# Patient Record
Sex: FEMALE | Race: WHITE | HISPANIC OR LATINO
[De-identification: names, ages, dates, MRNs, and addresses within clinical notes are randomized per-mention and may not be internally consistent; named-entity substitution may affect disease eponyms.]

---

## 2018-11-14 ENCOUNTER — APPOINTMENT (OUTPATIENT)
Dept: ANTEPARTUM | Facility: CLINIC | Age: 28
End: 2018-11-14
Payer: MEDICAID

## 2018-11-14 ENCOUNTER — ASOB RESULT (OUTPATIENT)
Age: 28
End: 2018-11-14

## 2018-11-14 PROBLEM — Z00.00 ENCOUNTER FOR PREVENTIVE HEALTH EXAMINATION: Status: ACTIVE | Noted: 2018-11-14

## 2018-11-14 PROCEDURE — 76801 OB US < 14 WKS SINGLE FETUS: CPT

## 2019-02-13 ENCOUNTER — APPOINTMENT (OUTPATIENT)
Dept: ANTEPARTUM | Facility: CLINIC | Age: 29
End: 2019-02-13
Payer: MEDICAID

## 2019-02-13 ENCOUNTER — ASOB RESULT (OUTPATIENT)
Age: 29
End: 2019-02-13

## 2019-02-13 PROCEDURE — 76811 OB US DETAILED SNGL FETUS: CPT

## 2019-02-13 PROCEDURE — 76817 TRANSVAGINAL US OBSTETRIC: CPT

## 2019-04-10 ENCOUNTER — APPOINTMENT (OUTPATIENT)
Dept: ANTEPARTUM | Facility: CLINIC | Age: 29
End: 2019-04-10
Payer: MEDICAID

## 2019-04-10 ENCOUNTER — ASOB RESULT (OUTPATIENT)
Age: 29
End: 2019-04-10

## 2019-04-10 PROCEDURE — 76820 UMBILICAL ARTERY ECHO: CPT

## 2019-04-10 PROCEDURE — 76821 MIDDLE CEREBRAL ARTERY ECHO: CPT

## 2019-04-10 PROCEDURE — 76819 FETAL BIOPHYS PROFIL W/O NST: CPT

## 2019-04-10 PROCEDURE — 76816 OB US FOLLOW-UP PER FETUS: CPT

## 2019-04-24 ENCOUNTER — OUTPATIENT (OUTPATIENT)
Dept: OUTPATIENT SERVICES | Facility: HOSPITAL | Age: 29
LOS: 1 days | End: 2019-04-24
Payer: COMMERCIAL

## 2019-04-24 ENCOUNTER — APPOINTMENT (OUTPATIENT)
Age: 29
End: 2019-04-24
Payer: MEDICAID

## 2019-04-24 ENCOUNTER — ASOB RESULT (OUTPATIENT)
Age: 29
End: 2019-04-24

## 2019-04-24 VITALS — TEMPERATURE: 98 F | RESPIRATION RATE: 16 BRPM

## 2019-04-24 VITALS — RESPIRATION RATE: 18 BRPM | TEMPERATURE: 98 F

## 2019-04-24 DIAGNOSIS — O47.03 FALSE LABOR BEFORE 37 COMPLETED WEEKS OF GESTATION, THIRD TRIMESTER: ICD-10-CM

## 2019-04-24 LAB
ALBUMIN SERPL ELPH-MCNC: 3.5 G/DL — SIGNIFICANT CHANGE UP (ref 3.3–5.2)
ALP SERPL-CCNC: 140 U/L — HIGH (ref 40–120)
ALT FLD-CCNC: 8 U/L — SIGNIFICANT CHANGE UP
ANION GAP SERPL CALC-SCNC: 12 MMOL/L — SIGNIFICANT CHANGE UP (ref 5–17)
APPEARANCE UR: ABNORMAL
AST SERPL-CCNC: 12 U/L — SIGNIFICANT CHANGE UP
BASOPHILS # BLD AUTO: 0 K/UL — SIGNIFICANT CHANGE UP (ref 0–0.2)
BASOPHILS NFR BLD AUTO: 0.3 % — SIGNIFICANT CHANGE UP (ref 0–2)
BILIRUB SERPL-MCNC: <0.2 MG/DL — LOW (ref 0.4–2)
BILIRUB UR-MCNC: NEGATIVE — SIGNIFICANT CHANGE UP
BUN SERPL-MCNC: 6 MG/DL — LOW (ref 8–20)
CALCIUM SERPL-MCNC: 8.5 MG/DL — LOW (ref 8.6–10.2)
CHLORIDE SERPL-SCNC: 102 MMOL/L — SIGNIFICANT CHANGE UP (ref 98–107)
CO2 SERPL-SCNC: 19 MMOL/L — LOW (ref 22–29)
COLOR SPEC: YELLOW — SIGNIFICANT CHANGE UP
CREAT SERPL-MCNC: 0.26 MG/DL — LOW (ref 0.5–1.3)
DIFF PNL FLD: ABNORMAL
EOSINOPHIL # BLD AUTO: 0 K/UL — SIGNIFICANT CHANGE UP (ref 0–0.5)
EOSINOPHIL NFR BLD AUTO: 0.6 % — SIGNIFICANT CHANGE UP (ref 0–6)
EPI CELLS # UR: SIGNIFICANT CHANGE UP
FIBRONECTIN FETAL SPEC QL: POSITIVE
GLUCOSE SERPL-MCNC: 99 MG/DL — SIGNIFICANT CHANGE UP (ref 70–115)
GLUCOSE UR QL: NEGATIVE MG/DL — SIGNIFICANT CHANGE UP
HCT VFR BLD CALC: 33.3 % — LOW (ref 37–47)
HGB BLD-MCNC: 10.9 G/DL — LOW (ref 12–16)
KETONES UR-MCNC: ABNORMAL
LEUKOCYTE ESTERASE UR-ACNC: ABNORMAL
LYMPHOCYTES # BLD AUTO: 2 K/UL — SIGNIFICANT CHANGE UP (ref 1–4.8)
LYMPHOCYTES # BLD AUTO: 29.4 % — SIGNIFICANT CHANGE UP (ref 20–55)
MCHC RBC-ENTMCNC: 28.4 PG — SIGNIFICANT CHANGE UP (ref 27–31)
MCHC RBC-ENTMCNC: 32.7 G/DL — SIGNIFICANT CHANGE UP (ref 32–36)
MCV RBC AUTO: 86.7 FL — SIGNIFICANT CHANGE UP (ref 81–99)
MONOCYTES # BLD AUTO: 0.4 K/UL — SIGNIFICANT CHANGE UP (ref 0–0.8)
MONOCYTES NFR BLD AUTO: 6.5 % — SIGNIFICANT CHANGE UP (ref 3–10)
NEUTROPHILS # BLD AUTO: 4.3 K/UL — SIGNIFICANT CHANGE UP (ref 1.8–8)
NEUTROPHILS NFR BLD AUTO: 62 % — SIGNIFICANT CHANGE UP (ref 37–73)
NITRITE UR-MCNC: NEGATIVE — SIGNIFICANT CHANGE UP
PH UR: 6.5 — SIGNIFICANT CHANGE UP (ref 5–8)
PLATELET # BLD AUTO: 185 K/UL — SIGNIFICANT CHANGE UP (ref 150–400)
POTASSIUM SERPL-MCNC: 3.9 MMOL/L — SIGNIFICANT CHANGE UP (ref 3.5–5.3)
POTASSIUM SERPL-SCNC: 3.9 MMOL/L — SIGNIFICANT CHANGE UP (ref 3.5–5.3)
PROT SERPL-MCNC: 7.1 G/DL — SIGNIFICANT CHANGE UP (ref 6.6–8.7)
PROT UR-MCNC: 30 MG/DL
RBC # BLD: 3.84 M/UL — LOW (ref 4.4–5.2)
RBC # FLD: 13 % — SIGNIFICANT CHANGE UP (ref 11–15.6)
RBC CASTS # UR COMP ASSIST: ABNORMAL /HPF (ref 0–4)
SODIUM SERPL-SCNC: 133 MMOL/L — LOW (ref 135–145)
SP GR SPEC: 1.02 — SIGNIFICANT CHANGE UP (ref 1.01–1.02)
UROBILINOGEN FLD QL: 1 MG/DL
WBC # BLD: 6.9 K/UL — SIGNIFICANT CHANGE UP (ref 4.8–10.8)
WBC # FLD AUTO: 6.9 K/UL — SIGNIFICANT CHANGE UP (ref 4.8–10.8)
WBC UR QL: ABNORMAL

## 2019-04-24 PROCEDURE — 93976 VASCULAR STUDY: CPT

## 2019-04-24 PROCEDURE — 59025 FETAL NON-STRESS TEST: CPT

## 2019-04-24 PROCEDURE — 85027 COMPLETE CBC AUTOMATED: CPT

## 2019-04-24 PROCEDURE — 36415 COLL VENOUS BLD VENIPUNCTURE: CPT

## 2019-04-24 PROCEDURE — 76820 UMBILICAL ARTERY ECHO: CPT

## 2019-04-24 PROCEDURE — 80053 COMPREHEN METABOLIC PANEL: CPT

## 2019-04-24 PROCEDURE — 76821 MIDDLE CEREBRAL ARTERY ECHO: CPT

## 2019-04-24 PROCEDURE — 96372 THER/PROPH/DIAG INJ SC/IM: CPT

## 2019-04-24 PROCEDURE — 87081 CULTURE SCREEN ONLY: CPT

## 2019-04-24 PROCEDURE — 81001 URINALYSIS AUTO W/SCOPE: CPT

## 2019-04-24 PROCEDURE — 76819 FETAL BIOPHYS PROFIL W/O NST: CPT

## 2019-04-24 PROCEDURE — 87491 CHLMYD TRACH DNA AMP PROBE: CPT

## 2019-04-24 PROCEDURE — 87591 N.GONORRHOEAE DNA AMP PROB: CPT

## 2019-04-24 PROCEDURE — 87800 DETECT AGNT MULT DNA DIREC: CPT

## 2019-04-24 PROCEDURE — G0463: CPT

## 2019-04-24 PROCEDURE — 82731 ASSAY OF FETAL FIBRONECTIN: CPT

## 2019-04-24 RX ORDER — NITROFURANTOIN MACROCRYSTAL 50 MG
100 CAPSULE ORAL
Qty: 0 | Refills: 0 | Status: DISCONTINUED | OUTPATIENT
Start: 2019-04-24 | End: 2019-05-09

## 2019-04-24 RX ORDER — ACETAMINOPHEN 500 MG
975 TABLET ORAL ONCE
Qty: 0 | Refills: 0 | Status: COMPLETED | OUTPATIENT
Start: 2019-04-24 | End: 2019-04-24

## 2019-04-24 RX ORDER — SODIUM CHLORIDE 9 MG/ML
1000 INJECTION, SOLUTION INTRAVENOUS
Qty: 0 | Refills: 0 | Status: DISCONTINUED | OUTPATIENT
Start: 2019-04-24 | End: 2019-05-09

## 2019-04-24 RX ORDER — SODIUM CHLORIDE 9 MG/ML
1000 INJECTION, SOLUTION INTRAVENOUS ONCE
Qty: 0 | Refills: 0 | Status: COMPLETED | OUTPATIENT
Start: 2019-04-24 | End: 2019-04-24

## 2019-04-24 RX ORDER — ACETAMINOPHEN 500 MG
2 TABLET ORAL
Qty: 40 | Refills: 0
Start: 2019-04-24 | End: 2019-04-28

## 2019-04-24 RX ORDER — NITROFURANTOIN MACROCRYSTAL 50 MG
1 CAPSULE ORAL
Qty: 14 | Refills: 0
Start: 2019-04-24 | End: 2019-04-30

## 2019-04-24 RX ADMIN — Medication 975 MILLIGRAM(S): at 18:33

## 2019-04-24 RX ADMIN — Medication 100 MILLIGRAM(S): at 20:05

## 2019-04-24 RX ADMIN — Medication 975 MILLIGRAM(S): at 19:03

## 2019-04-24 RX ADMIN — SODIUM CHLORIDE 125 MILLILITER(S): 9 INJECTION, SOLUTION INTRAVENOUS at 20:01

## 2019-04-24 RX ADMIN — Medication 12 MILLIGRAM(S): at 19:40

## 2019-04-24 RX ADMIN — SODIUM CHLORIDE 2000 MILLILITER(S): 9 INJECTION, SOLUTION INTRAVENOUS at 18:24

## 2019-04-24 NOTE — OB PROVIDER TRIAGE NOTE - NSOBPROVIDERNOTE_OBGYN_ALL_OB_FT
29 y/o F  @ 32wks 5ds complicated by mild polyhydramnios comes to L&D complaining of Left Lower Abdominal/Pelvic pain, dysuria, vaginal secretions. Physical is concerning for CVA on Left and Left sided tenderness with moderate voluntary guarding. Cervix closed. Will observe to rule out labor. Labs obtain to r/o UTI, VB, GC/Chlamydia. 29 y/o F  @ 32wks 5ds complicated by mild polyhydramnios comes to L&D complaining of Left Lower Abdominal/Pelvic pain, dysuria, vaginal secretions. Physical is concerning for CVA on Left and Left sided tenderness with moderate voluntary guarding. Cervix closed. Will observe to rule out labor. Labs obtain to r/o UTI, VB, GC/Chlamydia.    Addendum:  Dr. Fitzgerald Note  Ua positive for uti  ffn positive   patient status post iv hydration with cessation of pain and contractions  plan;  discharge home  give betamethasone x 2 days  treat for uti  increase IV hydration 29 y/o F  @ 32wks 5ds complicated by mild polyhydramnios comes to L&D complaining of Left Lower Abdominal/Pelvic pain, dysuria, vaginal secretions. Physical is concerning for CVA on Left and Left sided tenderness with moderate voluntary guarding. Cervix closed. Will observe to rule out labor. Labs obtain to r/o UTI, VB, GC/Chlamydia.    Addendum:  FFN +, UTI positive leukocyte esterase and leukocytosis, negative nitrates.  Pt feels better after PO tylenol and IV hydration.   Dx: UTI   Tx:   Betamethasone 100 mg IM q24 hrs x2 days  Macrobid 100 mg BID x7 days  Tylenol 650 mg PO q6hrs PRN for Moderate Pain  Aggressive at Home hydration.  Discharge home.  Patient informed of results and treatment. Educated to come back if symptoms worsen.     Case discussed with [facility] and Attending on Call, Dr. Fitzgerald  -José Miguel Giordano MD;  PGY-1      Addendum:  Dr. Fitzgerald Note  Ua positive for uti  ffn positive   patient status post iv hydration with cessation of pain and contractions  plan;  discharge home  give betamethasone x 2 days  treat for uti  increase IV hydration

## 2019-04-24 NOTE — OB PROVIDER TRIAGE NOTE - NSHPPHYSICALEXAM_GEN_ALL_CORE
Const: Awake, alert and oriented x3. Uncomfortable appereance.  HEENT: NC/AT, PERRLA, EOMI, clear nares, Moist mucous membranes. No erythema, lesions, secretions.   Neck:. Soft and supple. Full ROM without pain.  Cardiovascular: Regular rate and regular rhythm. +S1/S2. No murmurs. Peripheral pulses 2+ and symmetric. No LE edema.  Respiratory: Speaking in full sentences. No evidence of respiratory distress. CTAB. No wheezes, crackles, rales or rhonchi.  Abd: Gravid Abdomen, Normal bowel sounds in all 4 quadrants, Soft,  Left lower quadrant tenderness with moderate guarding. No rebound. Negative Boyce, McBurney, Rovsing.   Back: Spine midline, non-tender. No muscular spasms. Left sided CVA Tenderness.  Cervical: Moderate White Secretion, 0/0/-3, Medium

## 2019-04-24 NOTE — OB PROVIDER TRIAGE NOTE - HISTORY OF PRESENT ILLNESS
27 y/o F  @ 32wks 5ds complicated by mild polyhydramnios comes to L&D complaining of Left Lower Abdominal/Pelvic pain that started today at 13:00. Pain is described as sharp and intermittent every 10 mins, non radiating. Also complains of 3 days of burning in urination and 1 week of yellowish vaginal secretions without fetid smell. Last sexual intercourse was last week. Denies HA, Fever, Chills, Malaise, CP, SOB, N/V/D/C, hematuria, vaginal bleeding, water rupture, feeling contractions, hx of nephrolithiasis. Attest to normal fetal movement, last PO intake was at 13:00.

## 2019-04-24 NOTE — OB RN TRIAGE NOTE - CHIEF COMPLAINT QUOTE
from Dr Yousif office to R/O UTI,  labor  Patient c/o pain LLQ 5/10 Q10 minutes.  patient states she has burning, frequency and pressure upon urination x 2 weeks  yellow discharge x 3 days from Dr Yousif office to R/O UTI,  labor  Patient c/o pain LLQ 5/10 Q10 minutes.  patient states she has burning, frequency and pressure upon urination x 2 weeks  yellow discharge x 3 days lower back pain

## 2019-04-24 NOTE — OB PROVIDER TRIAGE NOTE - ADDITIONAL INSTRUCTIONS
1. keflex  2. return in 24 hours for betamethasone  3. follow up in 2 weeks in [facility]

## 2019-04-25 ENCOUNTER — OUTPATIENT (OUTPATIENT)
Dept: OUTPATIENT SERVICES | Facility: HOSPITAL | Age: 29
LOS: 1 days | End: 2019-04-25
Payer: COMMERCIAL

## 2019-04-25 VITALS — SYSTOLIC BLOOD PRESSURE: 108 MMHG | DIASTOLIC BLOOD PRESSURE: 61 MMHG | HEART RATE: 84 BPM

## 2019-04-25 VITALS — SYSTOLIC BLOOD PRESSURE: 117 MMHG | DIASTOLIC BLOOD PRESSURE: 59 MMHG | HEART RATE: 94 BPM

## 2019-04-25 DIAGNOSIS — O47.03 FALSE LABOR BEFORE 37 COMPLETED WEEKS OF GESTATION, THIRD TRIMESTER: ICD-10-CM

## 2019-04-25 PROCEDURE — G0463: CPT

## 2019-04-25 PROCEDURE — 59025 FETAL NON-STRESS TEST: CPT

## 2019-04-25 RX ADMIN — Medication 12 MILLIGRAM(S): at 20:25

## 2019-04-25 NOTE — OB PROVIDER TRIAGE NOTE - NSHPPHYSICALEXAM_GEN_ALL_CORE
normal...
Vital Signs   T(F): 97.7 (25 Apr 2019 20:13), Max: 97.7 (25 Apr 2019 20:13)  HR: 84 (25 Apr 2019 20:34) (84 - 94)  BP: 108/61 (25 Apr 2019 20:34) (108/61 - 117/59)  RR: 17 (25 Apr 2019 20:33) (17 - 17)  SpO2: --    PHYSICAL EXAM  Gen: NAD, resting comfortably  Abd: gravid, nontender to palpation  SVE: deferred    FHT: category 1  [place]: no contractions

## 2019-04-25 NOTE — OB PROVIDER TRIAGE NOTE - NSOBPROVIDERNOTE_OBGYN_ALL_OB_FT
27 yo  at 32.6 weeks pregnancy complicated by mild polyhydramnios presents for her 2nd betamethasone injection.    -Got 2nd betamethasone injection  -FHT reassuring  -No contractions on toco  -Dysuria improving, on day#2 of 7 day Macrobid treatment  -Discharged with prenatal precautions  -To follow up with Ob/Gyn with scheduled appt    -Discussed with Dr Camacho

## 2019-04-25 NOTE — OB PROVIDER TRIAGE NOTE - HISTORY OF PRESENT ILLNESS
29 yo  at 32.6 weeks pregnancy complicated by mild polyhydramnios presents for her 2nd betamethasone injection. PAtient presented yesterday to L&D with lower abd pain and dysuria found to have a UTI and positive fFN. Patient was given her first dose of betameth yesterday and discharged with Macrobid for the UTI. She reports improvement in her dysuria. She denies any abd pain, contractions, LOF. She reports mild spotting last night and positive FM.

## 2019-04-26 LAB
C TRACH RRNA SPEC QL NAA+PROBE: SIGNIFICANT CHANGE UP
CULTURE RESULTS: SIGNIFICANT CHANGE UP
N GONORRHOEA RRNA SPEC QL NAA+PROBE: SIGNIFICANT CHANGE UP
SPECIMEN SOURCE: SIGNIFICANT CHANGE UP
SPECIMEN SOURCE: SIGNIFICANT CHANGE UP

## 2019-04-27 LAB
CANDIDA AB TITR SER: SIGNIFICANT CHANGE UP
G VAGINALIS DNA SPEC QL NAA+PROBE: DETECTED
T VAGINALIS SPEC QL WET PREP: SIGNIFICANT CHANGE UP

## 2019-05-15 ENCOUNTER — ASOB RESULT (OUTPATIENT)
Age: 29
End: 2019-05-15

## 2019-05-15 ENCOUNTER — APPOINTMENT (OUTPATIENT)
Dept: ANTEPARTUM | Facility: CLINIC | Age: 29
End: 2019-05-15
Payer: MEDICAID

## 2019-05-15 PROBLEM — Z20.2 CONTACT WITH AND (SUSPECTED) EXPOSURE TO INFECTIONS WITH A PREDOMINANTLY SEXUAL MODE OF TRANSMISSION: Chronic | Status: ACTIVE | Noted: 2019-04-24

## 2019-05-15 PROCEDURE — 76819 FETAL BIOPHYS PROFIL W/O NST: CPT

## 2019-05-15 PROCEDURE — 76816 OB US FOLLOW-UP PER FETUS: CPT

## 2019-05-29 ENCOUNTER — APPOINTMENT (OUTPATIENT)
Dept: ANTEPARTUM | Facility: CLINIC | Age: 29
End: 2019-05-29
Payer: MEDICAID

## 2019-05-29 ENCOUNTER — ASOB RESULT (OUTPATIENT)
Age: 29
End: 2019-05-29

## 2019-05-29 PROCEDURE — ZZZZZ: CPT

## 2019-05-29 PROCEDURE — 76819 FETAL BIOPHYS PROFIL W/O NST: CPT

## 2019-06-12 ENCOUNTER — ASOB RESULT (OUTPATIENT)
Age: 29
End: 2019-06-12

## 2019-06-12 ENCOUNTER — APPOINTMENT (OUTPATIENT)
Dept: ANTEPARTUM | Facility: CLINIC | Age: 29
End: 2019-06-12
Payer: MEDICAID

## 2019-06-12 PROCEDURE — 76816 OB US FOLLOW-UP PER FETUS: CPT

## 2019-06-12 PROCEDURE — 76819 FETAL BIOPHYS PROFIL W/O NST: CPT

## 2019-06-16 ENCOUNTER — OUTPATIENT (OUTPATIENT)
Dept: INPATIENT UNIT | Facility: HOSPITAL | Age: 29
LOS: 1 days | End: 2019-06-16
Payer: COMMERCIAL

## 2019-06-16 VITALS
HEART RATE: 81 BPM | RESPIRATION RATE: 18 BRPM | SYSTOLIC BLOOD PRESSURE: 131 MMHG | DIASTOLIC BLOOD PRESSURE: 79 MMHG | TEMPERATURE: 98 F

## 2019-06-16 VITALS — HEART RATE: 79 BPM | SYSTOLIC BLOOD PRESSURE: 119 MMHG | DIASTOLIC BLOOD PRESSURE: 69 MMHG

## 2019-06-16 DIAGNOSIS — O47.1 FALSE LABOR AT OR AFTER 37 COMPLETED WEEKS OF GESTATION: ICD-10-CM

## 2019-06-16 PROCEDURE — 59025 FETAL NON-STRESS TEST: CPT

## 2019-06-16 PROCEDURE — G0463: CPT

## 2019-06-16 NOTE — OB PROVIDER TRIAGE NOTE - NSHPPHYSICALEXAM_GEN_ALL_CORE
Vital Sign  T(F): 98.2 (16 Jun 2019 16:31), Max: 98.2 (16 Jun 2019 16:31)  HR: 81 (16 Jun 2019 16:37) (81 - 81)  BP: 131/79 (16 Jun 2019 16:37) (131/79 - 131/79)  RR: 18 (16 Jun 2019 16:31) (18 - 18)      Gen: Adult female sitting upright, NAD  GI: Soft non-tender, gravid abdomen  GYN: 2/0/-3  MSK: Bi-lateral non-pitting edema of the lower extremities

## 2019-06-16 NOTE — OB PROVIDER TRIAGE NOTE - HISTORY OF PRESENT ILLNESS
a 27 yo  LMP 2018, RITU 2019 EGA 40wks 2 days presents to L&D w/ complaints of having contractions. The patient states the contractions are occurring q15 mins.     Patient admits: +FM, vaginal discharge(white, thin, non-foul smelling)    pmhx: none  Meds: pnv  allergies: Penicillin (hives, puritiirs), penuts( anaphylaxis   Social: denies the use of  tobacco, alcohol and illegal drugs a 29 yo  LMP 2018, RITU 2019 EGA 40wks 2 days presents to L&D w/ complaints of having contractions. The patient states the contractions are occurring q15 mins.     Patient admits: +FM, vaginal discharge(white, thin, non-foul smelling)  Patient denies: Fever, chills, nausea, vomiting, blurry vision, cp, sob, abdominal pain, dysuria, vaginal bleeding.    pmhx: none  Meds: pnv  allergies: Penicillin (hives, puritiirs), penuts( anaphylaxis   Social: denies the use of  tobacco, alcohol and illegal drugs

## 2019-06-16 NOTE — OB PROVIDER TRIAGE NOTE - NSOBPROVIDERNOTE_OBGYN_ALL_OB_FT
a 29 yo  LMP 2018, RITU 2019 EGA 40wks 2 days presents to L&D w/ complaints of having contractions.      A/P  - NST- reactive- no evidence of contractions noted  - Patient has been instructed to follow up at [facility]. Patient has an appointment scheduled for 2019 a 27 yo  LMP 2018, RITU 2019 EGA 40wks 2 days presents to L&D w/ complaints of having contractions.      A/P  - NST- reactive- no evidence of contractions noted  - Patient has been instructed to follow up at [facility]. Patient has an appointment scheduled for 2019  - Patient has been instructed if she develops contractions q5mins, evidence of LOF, Vaginal bleeding or decreased Fetal movement to return to L&D.    Case has been discussed w attending on file.

## 2019-06-19 ENCOUNTER — INPATIENT (INPATIENT)
Facility: HOSPITAL | Age: 29
LOS: 2 days | Discharge: HOME | End: 2019-06-22
Attending: OBSTETRICS & GYNECOLOGY | Admitting: OBSTETRICS & GYNECOLOGY
Payer: COMMERCIAL

## 2019-06-19 VITALS
SYSTOLIC BLOOD PRESSURE: 129 MMHG | DIASTOLIC BLOOD PRESSURE: 83 MMHG | TEMPERATURE: 98 F | HEART RATE: 91 BPM | RESPIRATION RATE: 20 BRPM

## 2019-06-19 DIAGNOSIS — O47.1 FALSE LABOR AT OR AFTER 37 COMPLETED WEEKS OF GESTATION: ICD-10-CM

## 2019-06-19 DIAGNOSIS — O26.893 OTHER SPECIFIED PREGNANCY RELATED CONDITIONS, THIRD TRIMESTER: ICD-10-CM

## 2019-06-19 LAB
APPEARANCE UR: CLEAR — SIGNIFICANT CHANGE UP
BACTERIA # UR AUTO: ABNORMAL
BASOPHILS # BLD AUTO: 0 K/UL — SIGNIFICANT CHANGE UP (ref 0–0.2)
BASOPHILS NFR BLD AUTO: 0.1 % — SIGNIFICANT CHANGE UP (ref 0–2)
BILIRUB UR-MCNC: NEGATIVE — SIGNIFICANT CHANGE UP
COLOR SPEC: YELLOW — SIGNIFICANT CHANGE UP
DIFF PNL FLD: ABNORMAL
EOSINOPHIL # BLD AUTO: 0.2 K/UL — SIGNIFICANT CHANGE UP (ref 0–0.5)
EOSINOPHIL NFR BLD AUTO: 2.5 % — SIGNIFICANT CHANGE UP (ref 0–6)
EPI CELLS # UR: ABNORMAL
GLUCOSE UR QL: NEGATIVE MG/DL — SIGNIFICANT CHANGE UP
HCT VFR BLD CALC: 35.2 % — LOW (ref 37–47)
HGB BLD-MCNC: 11.6 G/DL — LOW (ref 12–16)
KETONES UR-MCNC: ABNORMAL
LEUKOCYTE ESTERASE UR-ACNC: ABNORMAL
LYMPHOCYTES # BLD AUTO: 1.9 K/UL — SIGNIFICANT CHANGE UP (ref 1–4.8)
LYMPHOCYTES # BLD AUTO: 26.8 % — SIGNIFICANT CHANGE UP (ref 20–55)
MCHC RBC-ENTMCNC: 27.6 PG — SIGNIFICANT CHANGE UP (ref 27–31)
MCHC RBC-ENTMCNC: 33 G/DL — SIGNIFICANT CHANGE UP (ref 32–36)
MCV RBC AUTO: 83.6 FL — SIGNIFICANT CHANGE UP (ref 81–99)
MONOCYTES # BLD AUTO: 0.4 K/UL — SIGNIFICANT CHANGE UP (ref 0–0.8)
MONOCYTES NFR BLD AUTO: 5.1 % — SIGNIFICANT CHANGE UP (ref 3–10)
NEUTROPHILS # BLD AUTO: 4.6 K/UL — SIGNIFICANT CHANGE UP (ref 1.8–8)
NEUTROPHILS NFR BLD AUTO: 64.9 % — SIGNIFICANT CHANGE UP (ref 37–73)
NITRITE UR-MCNC: NEGATIVE — SIGNIFICANT CHANGE UP
PH UR: 6 — SIGNIFICANT CHANGE UP (ref 5–8)
PLATELET # BLD AUTO: 193 K/UL — SIGNIFICANT CHANGE UP (ref 150–400)
PROT UR-MCNC: 15 MG/DL
RBC # BLD: 4.21 M/UL — LOW (ref 4.4–5.2)
RBC # FLD: 14.4 % — SIGNIFICANT CHANGE UP (ref 11–15.6)
RBC CASTS # UR COMP ASSIST: ABNORMAL /HPF (ref 0–4)
SP GR SPEC: 1.02 — SIGNIFICANT CHANGE UP (ref 1.01–1.02)
UROBILINOGEN FLD QL: 1 MG/DL
WBC # BLD: 7.1 K/UL — SIGNIFICANT CHANGE UP (ref 4.8–10.8)
WBC # FLD AUTO: 7.1 K/UL — SIGNIFICANT CHANGE UP (ref 4.8–10.8)
WBC UR QL: ABNORMAL

## 2019-06-19 RX ORDER — OXYTOCIN 10 UNIT/ML
333.33 VIAL (ML) INJECTION
Qty: 20 | Refills: 0 | Status: COMPLETED | OUTPATIENT
Start: 2019-06-19 | End: 2019-06-20

## 2019-06-19 RX ORDER — SODIUM CHLORIDE 9 MG/ML
1000 INJECTION, SOLUTION INTRAVENOUS
Refills: 0 | Status: DISCONTINUED | OUTPATIENT
Start: 2019-06-19 | End: 2019-06-20

## 2019-06-19 RX ORDER — CITRIC ACID/SODIUM CITRATE 300-500 MG
15 SOLUTION, ORAL ORAL EVERY 6 HOURS
Refills: 0 | Status: DISCONTINUED | OUTPATIENT
Start: 2019-06-19 | End: 2019-06-20

## 2019-06-19 RX ORDER — OXYTOCIN 10 UNIT/ML
333.33 VIAL (ML) INJECTION
Qty: 20 | Refills: 0 | Status: DISCONTINUED | OUTPATIENT
Start: 2019-06-19 | End: 2019-06-19

## 2019-06-19 RX ORDER — CITRIC ACID/SODIUM CITRATE 300-500 MG
30 SOLUTION, ORAL ORAL ONCE
Refills: 0 | Status: DISCONTINUED | OUTPATIENT
Start: 2019-06-19 | End: 2019-06-19

## 2019-06-19 RX ORDER — SODIUM CHLORIDE 9 MG/ML
1000 INJECTION, SOLUTION INTRAVENOUS
Refills: 0 | Status: DISCONTINUED | OUTPATIENT
Start: 2019-06-19 | End: 2019-06-19

## 2019-06-19 NOTE — OB PROVIDER H&P - HISTORY OF PRESENT ILLNESS
a 27 yo  LMP 2018 EGA 40wks RITU 2019 presents to L&D w/ complaints of contractions and LOF. The patient states the contractions are occurring q3-4mins and LOF+ at 9am on 6/15/2019.    Pt admits to: LOF and +FM  Patient denies: fever, chills, visual disturbances, cp, sob, dysuria, abdominal pain    pmhx: pt denies  pshx: pt denies  allergies: Penicillin, Hives  Social: Denies the use of alcohol, tobacco or illegal drugs a 29 yo  LMP 2018 EGA 40wks and 5 days RITU 2019 presents to L&D w/ complaints of contractions and LOF. The patient states the contractions are occurring q3-4mins and LOF+ at 9am on 6/15/2019.    Pt admits to: LOF and +FM  Patient denies: fever, chills, visual disturbances, cp, sob, dysuria, abdominal pain    pmhx: pt denies  pshx: pt denies  allergies: Penicillin, Hives  Social: Denies the use of alcohol, tobacco or illegal drugs

## 2019-06-19 NOTE — OB RN PATIENT PROFILE - ALERT: PERTINENT HISTORY
Ultra Screen at 12 Weeks/Fetal Non-Stress Test (NST)/1st Trimester Sonogram/20 Week Level II Sonogram

## 2019-06-19 NOTE — OB PROVIDER H&P - NSHPPHYSICALEXAM_GEN_ALL_CORE
Vital Signs  HR: 91 (19 Jun 2019 22:23) (91 - 91)  BP: 129/83 (19 Jun 2019 22:23) (129/83 - 129/83)      Gen: Adult female sitting upright, mild discomfort  Abdomen: Soft non-tender, gravid  GYN: performed by Nurse 3/50/-3    Sono performed at bedside- evidence vertex presentation    FHT: CAT1  [place]: regular q3-4mins

## 2019-06-19 NOTE — OB PROVIDER H&P - ALERT: PERTINENT HISTORY
Fetal Non-Stress Test (NST)/Ultra Screen at 12 Weeks/1st Trimester Sonogram/20 Week Level II Sonogram

## 2019-06-19 NOTE — OB PROVIDER H&P - ASSESSMENT
a 27 yo  LMP 2018 EGA 40wks RITU 2019 presents to L&D w/ complaints of contractions and LOF.      A/P  - Admit to labor and delivery  - routine labs  - consent  - expectant management      Discussed w/ attending on file

## 2019-06-20 ENCOUNTER — TRANSCRIPTION ENCOUNTER (OUTPATIENT)
Age: 29
End: 2019-06-20

## 2019-06-20 LAB
BLD GP AB SCN SERPL QL: SIGNIFICANT CHANGE UP
TYPE + AB SCN PNL BLD: SIGNIFICANT CHANGE UP

## 2019-06-20 RX ORDER — ACETAMINOPHEN 500 MG
650 TABLET ORAL ONCE
Refills: 0 | Status: COMPLETED | OUTPATIENT
Start: 2019-06-20 | End: 2019-06-20

## 2019-06-20 RX ORDER — LANOLIN
1 OINTMENT (GRAM) TOPICAL EVERY 6 HOURS
Refills: 0 | Status: DISCONTINUED | OUTPATIENT
Start: 2019-06-20 | End: 2019-06-22

## 2019-06-20 RX ORDER — TETANUS TOXOID, REDUCED DIPHTHERIA TOXOID AND ACELLULAR PERTUSSIS VACCINE, ADSORBED 5; 2.5; 8; 8; 2.5 [IU]/.5ML; [IU]/.5ML; UG/.5ML; UG/.5ML; UG/.5ML
0.5 SUSPENSION INTRAMUSCULAR ONCE
Refills: 0 | Status: DISCONTINUED | OUTPATIENT
Start: 2019-06-20 | End: 2019-06-22

## 2019-06-20 RX ORDER — PRAMOXINE HYDROCHLORIDE 150 MG/15G
1 AEROSOL, FOAM RECTAL EVERY 4 HOURS
Refills: 0 | Status: DISCONTINUED | OUTPATIENT
Start: 2019-06-20 | End: 2019-06-22

## 2019-06-20 RX ORDER — ACETAMINOPHEN 500 MG
975 TABLET ORAL
Refills: 0 | Status: DISCONTINUED | OUTPATIENT
Start: 2019-06-20 | End: 2019-06-22

## 2019-06-20 RX ORDER — SODIUM CHLORIDE 9 MG/ML
3 INJECTION INTRAMUSCULAR; INTRAVENOUS; SUBCUTANEOUS EVERY 8 HOURS
Refills: 0 | Status: DISCONTINUED | OUTPATIENT
Start: 2019-06-20 | End: 2019-06-21

## 2019-06-20 RX ORDER — KETOROLAC TROMETHAMINE 30 MG/ML
30 SYRINGE (ML) INJECTION ONCE
Refills: 0 | Status: DISCONTINUED | OUTPATIENT
Start: 2019-06-20 | End: 2019-06-20

## 2019-06-20 RX ORDER — DIPHENHYDRAMINE HCL 50 MG
25 CAPSULE ORAL EVERY 6 HOURS
Refills: 0 | Status: DISCONTINUED | OUTPATIENT
Start: 2019-06-20 | End: 2019-06-22

## 2019-06-20 RX ORDER — DIBUCAINE 1 %
1 OINTMENT (GRAM) RECTAL EVERY 6 HOURS
Refills: 0 | Status: DISCONTINUED | OUTPATIENT
Start: 2019-06-20 | End: 2019-06-22

## 2019-06-20 RX ORDER — BENZOCAINE 10 %
1 GEL (GRAM) MUCOUS MEMBRANE EVERY 6 HOURS
Refills: 0 | Status: DISCONTINUED | OUTPATIENT
Start: 2019-06-20 | End: 2019-06-22

## 2019-06-20 RX ORDER — GLYCERIN ADULT
1 SUPPOSITORY, RECTAL RECTAL AT BEDTIME
Refills: 0 | Status: DISCONTINUED | OUTPATIENT
Start: 2019-06-20 | End: 2019-06-22

## 2019-06-20 RX ORDER — OXYCODONE HYDROCHLORIDE 5 MG/1
5 TABLET ORAL ONCE
Refills: 0 | Status: DISCONTINUED | OUTPATIENT
Start: 2019-06-20 | End: 2019-06-22

## 2019-06-20 RX ORDER — OXYTOCIN 10 UNIT/ML
2 VIAL (ML) INJECTION
Qty: 30 | Refills: 0 | Status: DISCONTINUED | OUTPATIENT
Start: 2019-06-20 | End: 2019-06-22

## 2019-06-20 RX ORDER — IBUPROFEN 200 MG
600 TABLET ORAL EVERY 6 HOURS
Refills: 0 | Status: DISCONTINUED | OUTPATIENT
Start: 2019-06-20 | End: 2019-06-22

## 2019-06-20 RX ORDER — IBUPROFEN 200 MG
600 TABLET ORAL EVERY 6 HOURS
Refills: 0 | Status: COMPLETED | OUTPATIENT
Start: 2019-06-20 | End: 2020-05-18

## 2019-06-20 RX ORDER — SODIUM CHLORIDE 9 MG/ML
1000 INJECTION, SOLUTION INTRAVENOUS
Refills: 0 | Status: DISCONTINUED | OUTPATIENT
Start: 2019-06-20 | End: 2019-06-22

## 2019-06-20 RX ORDER — MAGNESIUM HYDROXIDE 400 MG/1
30 TABLET, CHEWABLE ORAL
Refills: 0 | Status: DISCONTINUED | OUTPATIENT
Start: 2019-06-20 | End: 2019-06-22

## 2019-06-20 RX ORDER — CITRIC ACID/SODIUM CITRATE 300-500 MG
30 SOLUTION, ORAL ORAL ONCE
Refills: 0 | Status: COMPLETED | OUTPATIENT
Start: 2019-06-20 | End: 2019-06-20

## 2019-06-20 RX ORDER — DOCUSATE SODIUM 100 MG
100 CAPSULE ORAL
Refills: 0 | Status: DISCONTINUED | OUTPATIENT
Start: 2019-06-20 | End: 2019-06-22

## 2019-06-20 RX ORDER — OXYCODONE HYDROCHLORIDE 5 MG/1
5 TABLET ORAL
Refills: 0 | Status: DISCONTINUED | OUTPATIENT
Start: 2019-06-20 | End: 2019-06-22

## 2019-06-20 RX ORDER — OXYTOCIN 10 UNIT/ML
333.33 VIAL (ML) INJECTION
Qty: 20 | Refills: 0 | Status: DISCONTINUED | OUTPATIENT
Start: 2019-06-20 | End: 2019-06-22

## 2019-06-20 RX ORDER — SIMETHICONE 80 MG/1
80 TABLET, CHEWABLE ORAL EVERY 4 HOURS
Refills: 0 | Status: DISCONTINUED | OUTPATIENT
Start: 2019-06-20 | End: 2019-06-22

## 2019-06-20 RX ORDER — HYDROCORTISONE 1 %
1 OINTMENT (GRAM) TOPICAL EVERY 6 HOURS
Refills: 0 | Status: DISCONTINUED | OUTPATIENT
Start: 2019-06-20 | End: 2019-06-22

## 2019-06-20 RX ORDER — AER TRAVELER 0.5 G/1
1 SOLUTION RECTAL; TOPICAL EVERY 4 HOURS
Refills: 0 | Status: DISCONTINUED | OUTPATIENT
Start: 2019-06-20 | End: 2019-06-22

## 2019-06-20 RX ADMIN — Medication 30 MILLILITER(S): at 02:15

## 2019-06-20 RX ADMIN — Medication 2 MILLIUNIT(S)/MIN: at 05:30

## 2019-06-20 RX ADMIN — SODIUM CHLORIDE 125 MILLILITER(S): 9 INJECTION, SOLUTION INTRAVENOUS at 00:00

## 2019-06-20 RX ADMIN — SODIUM CHLORIDE 125 MILLILITER(S): 9 INJECTION, SOLUTION INTRAVENOUS at 08:00

## 2019-06-20 RX ADMIN — Medication 1000 MILLIUNIT(S)/MIN: at 13:03

## 2019-06-20 RX ADMIN — Medication 650 MILLIGRAM(S): at 09:50

## 2019-06-20 RX ADMIN — Medication 30 MILLIGRAM(S): at 14:22

## 2019-06-20 RX ADMIN — Medication 1000 MILLIUNIT(S)/MIN: at 17:58

## 2019-06-20 RX ADMIN — Medication 30 MILLIGRAM(S): at 14:06

## 2019-06-20 RX ADMIN — Medication 650 MILLIGRAM(S): at 09:25

## 2019-06-20 NOTE — DISCHARGE NOTE OB - HOSPITAL COURSE
Patient is a 29yo  delivered via spontaneous vaginal delivery. She was transferred to postpartum unit without complications during her stay. Upon discharge she is voiding, tolerating PO, ambulating, and pain is well controlled.

## 2019-06-20 NOTE — OB PROVIDER DELIVERY SUMMARY - NSPROVIDERDELIVERYNOTE_OBGYN_ALL_OB_FT
at 13:03 PM of a live male, 4590 gm and Apgars 9/9. Delivered ABRAHAM, nuchal cord x 1 loose, clear fluid. Infant's head delivered with maternal expulsive efforts. Shoulders delivered without difficulty followed by the rest of the body. Nose and mouth were bulb suctioned. Cord clamped and cut after delay. Samples obtained. Baby handed to patient. Placenta delivered spontaneously, intact, 3VC. Fundus firm, minimal bleeding. Perineum and vagina inspected – no lacerations noted. EBL 200cc. Hemostasis noted. Pt tolerated procedure well, in stable condition, recovering in LDR. Infant in LDR. Instrument/sponge count correct x 2 and confirmed by nurse.  at 13:03 PM of a live male, 4590 gm and Apgars 9/9. Delivered ABRAHAM, nuchal cord x 1 loose, clear fluid. Infant's head delivered with maternal expulsive efforts. Shoulders delivered without difficulty followed by the rest of the body. Nose and mouth were bulb suctioned. Cord clamped and cut after delay. Samples obtained. Baby placed on mother's abdomen. Placenta delivered spontaneously, intact, 3VC. Fundus firm, minimal bleeding. Perineum and vagina inspected – no lacerations noted. EBL 200cc. Hemostasis noted. Pt tolerated procedure well, in stable condition, recovering in LDR. Infant in LDR. Instrument/sponge count correct x 2 and confirmed by nurse.    I was present throughout procedure  Nuchal cord delivered through, shoulders delivered without difficulty.  ppalos

## 2019-06-20 NOTE — DISCHARGE NOTE OB - MEDICATION SUMMARY - MEDICATIONS TO TAKE
I will START or STAY ON the medications listed below when I get home from the hospital:     mg oral tablet  -- 1 tab(s) by mouth every 8 hours   -- Do not take this drug if you are pregnant.  It is very important that you take or use this exactly as directed.  Do not skip doses or discontinue unless directed by your doctor.  May cause drowsiness or dizziness.  Obtain medical advice before taking any non-prescription drugs as some may affect the action of this medication.  Take with food or milk.    -- Indication: For moderate pain

## 2019-06-20 NOTE — DISCHARGE NOTE OB - CARE PLAN
Principal Discharge DX:	 (normal spontaneous vaginal delivery)  Goal:	rapid recovery  Assessment and plan of treatment:	Please call your provider to schedule postpartum visit in 6 weeks. Take medications as directed, regular diet, activity as tolerated. Exclusive breast feeding for the first 6 months is recommended. Nothing per vagina for 6 weeks (incl. sex, douching, etc). If you have additional concerns, please inform your provider.

## 2019-06-20 NOTE — CHART NOTE - NSCHARTNOTEFT_GEN_A_CORE
Pt reexamined:  5cm/50% effaced, +evidence of ROM but bulging forebag also palpable.  AROM again, clear fluid, mod amt.  Will continue to observe for now and consider pitocin prn.

## 2019-06-20 NOTE — DISCHARGE NOTE OB - PATIENT PORTAL LINK FT
You can access the [facility] Patient Portal, offered by [facility], by registering with the following website: [email or web address]

## 2019-06-20 NOTE — CHART NOTE - NSCHARTNOTEFT_GEN_A_CORE
OB attending progress note    S: Patient comfortable with epidural, no complains of pressure. s/p recent epidural bolus    O; VSS maternal flow sheet.    FHT: Cat 2 for non recurrent variables, moderate variability, accels  [place]: every 2-3min    VE: 9.5/100/-2    A/P: Multip with IUP @ 65ipn6p admitted for labor. GBSneg. Maternal/fetal status reassuring    -reassess in 1hr or prn  -cont current mgmt  ppalos

## 2019-06-20 NOTE — DISCHARGE NOTE OB - PLAN OF CARE
rapid recovery Please call your provider to schedule postpartum visit in 6 weeks. Take medications as directed, regular diet, activity as tolerated. Exclusive breast feeding for the first 6 months is recommended. Nothing per vagina for 6 weeks (incl. sex, douching, etc). If you have additional concerns, please inform your provider.

## 2019-06-20 NOTE — CHART NOTE - NSCHARTNOTEFT_GEN_A_CORE
OB attending progress note    S: Patient reports pressure with contractions.     O: VSS per mat flow sheet    Vital Signs Last 24 Hrs  T(C): 38.4 (20 Jun 2019 09:15), Max: 38.4 (20 Jun 2019 09:15)  T(F): 101.12 (20 Jun 2019 09:15), Max: 101.12 (20 Jun 2019 09:15)  HR: 71 (20 Jun 2019 09:55) (56 - 95)  BP: 107/60 (20 Jun 2019 09:55) (88/44 - 145/64)  BP(mean): --  RR: 15 (20 Jun 2019 07:15) (15 - 20)  SpO2: 96% (20 Jun 2019 07:15) (94% - 100%)    FHt: Cat 2 for non recurrent variables. Moderate variability, accels  [place]: every  2-3min    VE: 10/100/0    LABS:                        11.6   7.1   )-----------( 193      ( 19 Jun 2019 22:54 )             35.2     A/P: Multip with IUP @ 21qub6a admitted for labor. GBSneg. Maternal/fetal status reassuring    -allow for passive descent  -reassess in 1hr or prn    ppalos

## 2019-06-20 NOTE — DISCHARGE NOTE OB - CARE PROVIDER_API CALL
Win Fair)  Obstetrics and Gynecology  [address] [place] [address]  Phone: [phone]  Fax: [phone]  Follow Up Time:

## 2019-06-20 NOTE — CHART NOTE - NSCHARTNOTEFT_GEN_A_CORE
Patient seen and examined at bedside.    Vital Signs  T(F): 98.42 (20 Jun 2019 01:31), Max: 98.42 (20 Jun 2019 01:31)  HR: 69 (20 Jun 2019 02:55) (60 - 95)  BP: 114/63 (20 Jun 2019 02:55) (90/52 - 137/93)  RR: 20 (20 Jun 2019 01:31) (20 - 20)  SpO2: 98% (20 Jun 2019 03:00) (97% - 100%)    FHT: CAT 1  [place]: q5-7min irregular        AROM at 03:00 am  - Clear, moderate amount  - Cervical exam performed patient was noted to be 5/50/-3      a/p  - S/P epidural  - routine expectant mange ment      Discussed w/ attending on file.

## 2019-06-21 LAB
HCT VFR BLD CALC: 32.1 % — LOW (ref 37–47)
HGB BLD-MCNC: 10 G/DL — LOW (ref 12–16)
T PALLIDUM AB TITR SER: NEGATIVE — SIGNIFICANT CHANGE UP

## 2019-06-21 RX ADMIN — Medication 600 MILLIGRAM(S): at 13:00

## 2019-06-21 RX ADMIN — Medication 600 MILLIGRAM(S): at 18:31

## 2019-06-21 RX ADMIN — Medication 975 MILLIGRAM(S): at 16:11

## 2019-06-21 RX ADMIN — Medication 975 MILLIGRAM(S): at 17:00

## 2019-06-21 RX ADMIN — Medication 1 TABLET(S): at 12:12

## 2019-06-21 RX ADMIN — Medication 600 MILLIGRAM(S): at 17:59

## 2019-06-21 RX ADMIN — Medication 600 MILLIGRAM(S): at 05:22

## 2019-06-21 RX ADMIN — Medication 600 MILLIGRAM(S): at 12:12

## 2019-06-21 NOTE — PROGRESS NOTE ADULT - ASSESSMENT
A/P:  Patient is a 27yo  now PPD#1 s/p spontaneous vaginal delivery  -Stable  -Voiding, tolerating PO, bowel function nml   -Advance care as tolerated   -Continue routine postpartum/postoperative care and education.  -Pt encouraged to increase ambulation and PO intake  -D/C day 2 if meeting all expected milestones

## 2019-06-21 NOTE — PROGRESS NOTE ADULT - SUBJECTIVE AND OBJECTIVE BOX
Patient is a 27yo  now PPD#1 s/p spontaneous vaginal delivery    S:    No acute events overnight.  Pt seen and examined at bedside. Pt doing well.  Has no complaints.  Pain well controlled on current regiment.  Pt ambulating, tolerating PO, voiding w/o difficulty, +flatus/+BM.    O:    T(C): 37 (19 @ 19:53), Max: 38.3 (19 @ 09:15)  HR: 74 (19 @ 19:53) (68 - 107)  BP: 121/74 (19 @ 19:53) (100/52 - 141/80)  RR: 18 (19 @ 19:53) (15 - 18)  SpO2: 96% (19 @ 07:15) (94% - 96%)    Physical Exam  Breast: NT, non-engorged  Abdomen:  soft, NT, ND, BS+  Uterus:  firm below umbilicus  VE:  expectant lochia  Ext:  NT, nonedematous                          11.6   7.1   )-----------( 193      ( 2019 22:54 )             35.2

## 2019-06-22 VITALS
SYSTOLIC BLOOD PRESSURE: 108 MMHG | RESPIRATION RATE: 18 BRPM | TEMPERATURE: 98 F | DIASTOLIC BLOOD PRESSURE: 66 MMHG | HEART RATE: 73 BPM

## 2019-06-22 RX ORDER — IBUPROFEN 200 MG
1 TABLET ORAL
Qty: 30 | Refills: 0
Start: 2019-06-22

## 2019-06-22 RX ADMIN — Medication 0.5 MILLILITER(S): at 11:38

## 2019-06-22 RX ADMIN — Medication 1 TABLET(S): at 11:34

## 2019-06-22 RX ADMIN — Medication 600 MILLIGRAM(S): at 12:34

## 2019-06-22 RX ADMIN — Medication 600 MILLIGRAM(S): at 11:34

## 2019-06-22 NOTE — PROGRESS NOTE ADULT - SUBJECTIVE AND OBJECTIVE BOX
S: Patient doing well. Minimal lochia. No complaints.     O: Vital Signs Last 24 Hrs  T(C): 36.9 (2019 00:30), Max: 36.9 (2019 00:30)  T(F): 98.4 (2019 00:30), Max: 98.4 (2019 00:30)  HR: 71 (2019 00:30) (71 - 77)  BP: 105/66 (2019 00:30) (105/66 - 119/77)  BP(mean): --  RR: 18 (2019 00:30) (18 - 18)  SpO2: --    Gen: NAD  Breast :  Abd: soft, NT, ND, fundus firm below umbilicus  Ext: no tenderness    Labs:                        10.0   x     )-----------( x        ( 2019 08:18 )             32.1            PP # 2 s/p     Doing well.  Discharge home.  Nothing in vagina and no heavy lifting for 6 weeks.   Follow up 6 weeks for post partum visit.  Call office for any fevers, chills, heavy vaginal bleeding, symptoms of depression, or any other concerning symptoms.  Continue motrin 600 mg every 6 hours.

## 2019-06-22 NOTE — PROGRESS NOTE ADULT - SUBJECTIVE AND OBJECTIVE BOX
Patient is a 27yo  now PPD#2 s/p spontaneous vaginal delivery.     S:    No acute events overnight.  Pt seen and examined at bedside. Pt doing well.  Has no complaints.  Pain well controlled on current regiment.  Pt ambulating, tolerating PO, voiding w/o difficulty, +flatus/+BM.    O:    Vital Signs Last 24 Hrs  T(C): 36.9 (2019 00:30), Max: 36.9 (2019 00:30)  T(F): 98.4 (2019 00:30), Max: 98.4 (2019 00:30)  HR: 71 (2019 00:30) (71 - 77)  BP: 105/66 (2019 00:30) (105/66 - 119/77)  RR: 18 (2019 00:30) (18 - 18)    Physical Exam  Breast: NT, non-engorged  Abdomen:  soft, NT, ND  Uterus:  firm below umbilicus  VE:  expectant lochia  Ext:  NT, nonedematous    Labs:                        10.0   x     )-----------( x        ( 2019 08:18 )             32.1

## 2019-06-22 NOTE — PROGRESS NOTE ADULT - ASSESSMENT
Patient is a 29yo  now PPD#2 s/p spontaneous vaginal delivery.   -Stable  -Ambulating, tolerating PO, +voiding, normal bowel function  -Educated on postpartum care  -Meeting all postpartum expectations, ready to be discharged

## 2019-07-10 PROCEDURE — 86850 RBC ANTIBODY SCREEN: CPT

## 2019-07-10 PROCEDURE — 85018 HEMOGLOBIN: CPT

## 2019-07-10 PROCEDURE — T1013: CPT

## 2019-07-10 PROCEDURE — 59412 ANTEPARTUM MANIPULATION: CPT

## 2019-07-10 PROCEDURE — 86780 TREPONEMA PALLIDUM: CPT

## 2019-07-10 PROCEDURE — 86901 BLOOD TYPING SEROLOGIC RH(D): CPT

## 2019-07-10 PROCEDURE — 85027 COMPLETE CBC AUTOMATED: CPT

## 2019-07-10 PROCEDURE — G0463: CPT

## 2019-07-10 PROCEDURE — 81001 URINALYSIS AUTO W/SCOPE: CPT

## 2019-07-10 PROCEDURE — 86900 BLOOD TYPING SEROLOGIC ABO: CPT

## 2019-07-10 PROCEDURE — 90707 MMR VACCINE SC: CPT

## 2019-07-10 PROCEDURE — 59050 FETAL MONITOR W/REPORT: CPT

## 2019-07-10 PROCEDURE — 85014 HEMATOCRIT: CPT

## 2019-07-10 PROCEDURE — 36415 COLL VENOUS BLD VENIPUNCTURE: CPT

## 2019-07-10 PROCEDURE — 59025 FETAL NON-STRESS TEST: CPT

## 2020-01-01 NOTE — OB RN TRIAGE NOTE - PAIN SCALE PREFERRED, PROFILE
Continue to fortify EBM with neosure powder for 22kcal/oz  Feed infant ad tristan with a minimum of 40mL q3hrs   If not completing 40mL q3hrs will gavage remainder  Encourage nippling with all feeds cue based  Continue daily supplementation with polyvisol and ferrous sulfate  Monitor I&Os  Monitor daily weight and weekly HC and L numerical 0-10

## 2021-07-16 NOTE — OB RN TRIAGE NOTE - NS_VISITREASON1_OBGYN_ALL_OB
[Time Spent: ___ minutes] : I have spent [unfilled] minutes of time on the encounter. Other [>50% of the face to face encounter time was spent on counseling and/or coordination of care for ___] : Greater than 50% of the face to face encounter time was spent on counseling and/or coordination of care for [unfilled]

## 2023-03-02 NOTE — OB RN PATIENT PROFILE - SPIRITUAL CULTURAL, RELIGIOUS PRACTICES/VALUES, PROFILE
Patient prescribed carvedilol and now has indigestion and headaches. Headaches are daily all day. Not sure if its a side effect from the medication. Unsure of what she should do. RC

## 2023-04-04 NOTE — OB PROVIDER DELIVERY SUMMARY - NSANESTHESIAVD_OBGYN_ALL_OB
Refill request for lisinopril  ACE Inhibitor Refill Protocol - 12 Month Protocol Failed 04/01/2023 01:14 AM   Protocol Details  Normal Creatinine within last 12 months looking at last value    Normal Potassium within last 12 months looking at last value       LOV 8/18/2022    PSR PLEASE SCHEDULE OV CPE   Epidural

## 2023-06-01 NOTE — OB RN PATIENT PROFILE - PRO PRENATAL LABS ORI SOURCE VDRL/RPR
--Call placed to patient who confirmed she read lab results online. Patient agrees to start back taking Potassium two tablets daily. Requesting prescription be sent to [facility]     --Patient also requesting to follow up regarding needing letter stating she takes Wegovy. States she is traveling to [facility] for a month and need letter to take medication with her. She will be flying there.     Please advise. Thank you.    
Kavita STAPLETON Staff    ----- Message from Kavita Benjamin sent at 5/31/2023 12:37 PM CDT -----  Contact: pt  Type: Needs Medical Advice  Who Called:  pt     Pharmacy name and phone #:        [id number] - [place] - [address]  [id number]  [facility] [address]  Phone: [phone] Fax: [phone]      Best Call Back Number: [phone]    Additional Information: pt is trying to get a update on getting her letter for when she go out of town for wegovy and a refill for potassium chloride SA (K-DUR,KLOR-CON) 20 MEQ tablet. Please advise        
Patient notified letter ready for .   
Potassium refill sent to [facility] at [place].  Sig changed to current use.    Letter for travel with the [place] is on [employer]'s desk  
hard copy, drawn during this pregnancy

## 2023-07-02 NOTE — OB RN TRIAGE NOTE - NS_LMP_OBGYN_ALL_OB_DT
[FreeTextEntry1] : Follow-up of MDD/ELIA symptoms, chronic insomnia, and prescription refills. [de-identified] : Patient presents for follow-up of major depressive disorder, generalized anxiety disorder, chronic insomnia, and medication titration. Recently restarted escitalopram 5 mg/day. Depressive/anxiety symptoms are fairly well controlled on present dose. However, willing to increase dose if needed. Requesting refill of trazodone 50 mg HS, for insomnia. In addition, reports urgency frequency and mild dysuria over the last week. However symptoms have almost resolved at this time. No turbid appearing or foul-smelling urine. Denies hematuria. 07-Sep-2018

## 2023-08-30 NOTE — OB RN TRIAGE NOTE - BP NONINVASIVE SYSTOLIC (MM HG)
[FreeTextEntry1] :  History of obesity: Now overweight -BMI now 27 -She remains on Ozempic to 2 mg once weekly  -She states she is exercising and trying to eat healthy -Her current hemoglobin A1c was normal at 5.1 and her cholesterol was normal at 198 8/2023 transfer text  Hyperlipidemia: -Her most recent total cholesterol was normal at 198 8/2023  Vitamin D insufficiency -Her most recent vitamin D 25 OH level was mildly low at 28.8 8/2023 - vitamin D3 1000 units daily   HCM:  CPE: 12/2/2022  Depression screening: 3/3/2023 PHQ 2 score 0  EKG 12/2/2022   Flu shot: advised 8/30/2023-she declined  Tdap:  2013-advised 3/3/2023-will discuss at next visit  Covid vaccine: -she refuses  HIV testing: offered 5/19/2023-she declined  Colonoscopy: 3/2021 normal per pt  EGD 5/2021 normal per pt  GYN/PAP: 9/2022-I have advised that she make appointment to see her GYN for her annual GYN exam  Annual skin exam: She was previously referred to dermatology for annual skin exam-.  I again advised today  Follow-up in 4 months for weight check
107

## 2024-06-21 NOTE — OB PROVIDER IHI INDUCTION/AUGMENTATION NOTE - NS_OBIHIINDICATION_OBGYN_ALL_OB
Spoke with: Patient       Date of surgery: Wednesday July 3 2024 with Dr Sánchez      Location: [facility]      Informed patient they will need a adult : YES      Pre op with provider: Patient had a recent PCP visit on 6/10 through [facility] and a [facility] visit on 6/19   Message sent to Dr Sánchez to see if he is ok updating pre op DOS     Update Dr Sánchez will do Pre op DOS        H&P Scheduled in [facility]- NA         Pre procedure covid : Not req       Additional imaging: NA        Surgery Packet :Sent via [facility]      Additional comments: Please call for surgery teaching   
Inadequate uterine contraction

## 2024-09-12 ENCOUNTER — ASOB RESULT (OUTPATIENT)
Age: 34
End: 2024-09-12

## 2024-09-12 ENCOUNTER — APPOINTMENT (OUTPATIENT)
Dept: ANTEPARTUM | Facility: CLINIC | Age: 34
End: 2024-09-12
Payer: MEDICAID

## 2024-09-12 PROCEDURE — 76801 OB US < 14 WKS SINGLE FETUS: CPT

## 2024-10-03 ENCOUNTER — APPOINTMENT (OUTPATIENT)
Dept: ANTEPARTUM | Facility: CLINIC | Age: 34
End: 2024-10-03
Payer: MEDICAID

## 2024-10-03 ENCOUNTER — ASOB RESULT (OUTPATIENT)
Age: 34
End: 2024-10-03

## 2024-10-03 PROCEDURE — 76813 OB US NUCHAL MEAS 1 GEST: CPT

## 2024-10-07 ENCOUNTER — APPOINTMENT (OUTPATIENT)
Dept: MATERNAL FETAL MEDICINE | Facility: CLINIC | Age: 34
End: 2024-10-07
Payer: MEDICAID

## 2024-10-07 ENCOUNTER — ASOB RESULT (OUTPATIENT)
Age: 34
End: 2024-10-07

## 2024-10-07 VITALS — WEIGHT: 174 LBS | BODY MASS INDEX: 32.85 KG/M2 | HEIGHT: 61 IN

## 2024-10-07 DIAGNOSIS — Z78.9 OTHER SPECIFIED HEALTH STATUS: ICD-10-CM

## 2024-10-07 DIAGNOSIS — O99.810 ABNORMAL GLUCOSE COMPLICATING PREGNANCY: ICD-10-CM

## 2024-10-07 PROCEDURE — G0108 DIAB MANAGE TRN  PER INDIV: CPT | Mod: 95

## 2024-10-21 ENCOUNTER — APPOINTMENT (OUTPATIENT)
Dept: ANTEPARTUM | Facility: CLINIC | Age: 34
End: 2024-10-21

## 2024-10-21 ENCOUNTER — APPOINTMENT (OUTPATIENT)
Dept: MATERNAL FETAL MEDICINE | Facility: CLINIC | Age: 34
End: 2024-10-21

## 2024-11-04 ENCOUNTER — APPOINTMENT (OUTPATIENT)
Dept: MATERNAL FETAL MEDICINE | Facility: CLINIC | Age: 34
End: 2024-11-04
Payer: MEDICAID

## 2024-11-04 ENCOUNTER — ASOB RESULT (OUTPATIENT)
Age: 34
End: 2024-11-04

## 2024-11-04 DIAGNOSIS — O99.810 ABNORMAL GLUCOSE COMPLICATING PREGNANCY: ICD-10-CM

## 2024-11-04 PROCEDURE — G0108 DIAB MANAGE TRN  PER INDIV: CPT | Mod: 95

## 2024-11-06 ENCOUNTER — APPOINTMENT (OUTPATIENT)
Dept: ANTEPARTUM | Facility: CLINIC | Age: 34
End: 2024-11-06

## 2024-11-06 ENCOUNTER — APPOINTMENT (OUTPATIENT)
Dept: MATERNAL FETAL MEDICINE | Facility: CLINIC | Age: 34
End: 2024-11-06
Payer: MEDICAID

## 2024-11-06 VITALS
OXYGEN SATURATION: 98 % | RESPIRATION RATE: 16 BRPM | SYSTOLIC BLOOD PRESSURE: 132 MMHG | HEIGHT: 61 IN | HEART RATE: 88 BPM | WEIGHT: 172.38 LBS | BODY MASS INDEX: 32.55 KG/M2 | DIASTOLIC BLOOD PRESSURE: 73 MMHG

## 2024-11-06 DIAGNOSIS — O99.212 OBESITY COMPLICATING PREGNANCY, SECOND TRIMESTER: ICD-10-CM

## 2024-11-06 DIAGNOSIS — Z3A.17 17 WEEKS GESTATION OF PREGNANCY: ICD-10-CM

## 2024-11-06 DIAGNOSIS — O24.410 GESTATIONAL DIABETES MELLITUS IN PREGNANCY, DIET CONTROLLED: ICD-10-CM

## 2024-11-06 DIAGNOSIS — Z87.59 PERSONAL HISTORY OF OTHER COMPLICATIONS OF PREGNANCY, CHILDBIRTH AND THE PUERPERIUM: ICD-10-CM

## 2024-11-06 PROCEDURE — 99205 OFFICE O/P NEW HI 60 MIN: CPT | Mod: TH

## 2024-11-18 ENCOUNTER — ASOB RESULT (OUTPATIENT)
Age: 34
End: 2024-11-18

## 2024-11-18 ENCOUNTER — APPOINTMENT (OUTPATIENT)
Dept: MATERNAL FETAL MEDICINE | Facility: CLINIC | Age: 34
End: 2024-11-18
Payer: MEDICAID

## 2024-11-18 VITALS — WEIGHT: 172.38 LBS | HEIGHT: 61 IN | BODY MASS INDEX: 32.55 KG/M2

## 2024-11-18 PROCEDURE — G0108 DIAB MANAGE TRN  PER INDIV: CPT | Mod: 95

## 2024-12-05 ENCOUNTER — APPOINTMENT (OUTPATIENT)
Dept: ANTEPARTUM | Facility: CLINIC | Age: 34
End: 2024-12-05

## 2024-12-05 ENCOUNTER — ASOB RESULT (OUTPATIENT)
Age: 34
End: 2024-12-05

## 2024-12-05 ENCOUNTER — APPOINTMENT (OUTPATIENT)
Dept: MATERNAL FETAL MEDICINE | Facility: CLINIC | Age: 34
End: 2024-12-05
Payer: MEDICAID

## 2024-12-05 VITALS
SYSTOLIC BLOOD PRESSURE: 112 MMHG | WEIGHT: 172.44 LBS | HEIGHT: 61 IN | HEART RATE: 82 BPM | RESPIRATION RATE: 16 BRPM | DIASTOLIC BLOOD PRESSURE: 64 MMHG | BODY MASS INDEX: 32.56 KG/M2 | OXYGEN SATURATION: 98 %

## 2024-12-05 VITALS
DIASTOLIC BLOOD PRESSURE: 64 MMHG | RESPIRATION RATE: 16 BRPM | HEIGHT: 61 IN | OXYGEN SATURATION: 98 % | HEART RATE: 82 BPM | BODY MASS INDEX: 32.47 KG/M2 | SYSTOLIC BLOOD PRESSURE: 112 MMHG | WEIGHT: 172 LBS

## 2024-12-05 DIAGNOSIS — Z3A.21 21 WEEKS GESTATION OF PREGNANCY: ICD-10-CM

## 2024-12-05 PROCEDURE — 76811 OB US DETAILED SNGL FETUS: CPT

## 2024-12-05 PROCEDURE — 99215 OFFICE O/P EST HI 40 MIN: CPT | Mod: TH

## 2024-12-05 PROCEDURE — 76817 TRANSVAGINAL US OBSTETRIC: CPT

## 2024-12-06 LAB
ESTIMATED AVERAGE GLUCOSE: 91 MG/DL
HBA1C MFR BLD HPLC: 4.8 %

## 2024-12-10 ENCOUNTER — APPOINTMENT (OUTPATIENT)
Dept: PEDIATRIC CARDIOLOGY | Facility: CLINIC | Age: 34
End: 2024-12-10
Payer: MEDICAID

## 2024-12-10 DIAGNOSIS — Z3A.22 22 WEEKS GESTATION OF PREGNANCY: ICD-10-CM

## 2024-12-10 DIAGNOSIS — O35.9XX0 MATERNAL CARE FOR (SUSPECTED) FETAL ABNORMALITY AND DAMAGE, UNSPECIFIED, NOT APPLICABLE OR UNSPECIFIED: ICD-10-CM

## 2024-12-10 DIAGNOSIS — O99.212 OBESITY COMPLICATING PREGNANCY, SECOND TRIMESTER: ICD-10-CM

## 2024-12-10 DIAGNOSIS — O24.410 GESTATIONAL DIABETES MELLITUS IN PREGNANCY, DIET CONTROLLED: ICD-10-CM

## 2024-12-10 PROCEDURE — 76825 ECHO EXAM OF FETAL HEART: CPT

## 2024-12-10 PROCEDURE — 99204 OFFICE O/P NEW MOD 45 MIN: CPT | Mod: 25

## 2024-12-10 PROCEDURE — 76821 MIDDLE CEREBRAL ARTERY ECHO: CPT

## 2024-12-10 PROCEDURE — 76827 ECHO EXAM OF FETAL HEART: CPT

## 2024-12-10 PROCEDURE — 76820 UMBILICAL ARTERY ECHO: CPT

## 2024-12-10 PROCEDURE — 93325 DOPPLER ECHO COLOR FLOW MAPG: CPT | Mod: 59

## 2024-12-19 ENCOUNTER — ASOB RESULT (OUTPATIENT)
Age: 34
End: 2024-12-19

## 2024-12-19 ENCOUNTER — APPOINTMENT (OUTPATIENT)
Dept: MATERNAL FETAL MEDICINE | Facility: CLINIC | Age: 34
End: 2024-12-19
Payer: MEDICAID

## 2024-12-19 VITALS — HEIGHT: 61 IN | WEIGHT: 172 LBS | BODY MASS INDEX: 32.47 KG/M2

## 2024-12-19 PROCEDURE — G0108 DIAB MANAGE TRN  PER INDIV: CPT | Mod: 95

## 2025-01-06 ENCOUNTER — APPOINTMENT (OUTPATIENT)
Dept: MATERNAL FETAL MEDICINE | Facility: CLINIC | Age: 35
End: 2025-01-06
Payer: MEDICAID

## 2025-01-06 ENCOUNTER — ASOB RESULT (OUTPATIENT)
Age: 35
End: 2025-01-06

## 2025-01-06 VITALS — WEIGHT: 175.19 LBS | HEIGHT: 61 IN | BODY MASS INDEX: 33.07 KG/M2

## 2025-01-06 PROCEDURE — G0108 DIAB MANAGE TRN  PER INDIV: CPT | Mod: 95

## 2025-01-16 ENCOUNTER — APPOINTMENT (OUTPATIENT)
Dept: ANTEPARTUM | Facility: CLINIC | Age: 35
End: 2025-01-16
Payer: MEDICAID

## 2025-01-16 ENCOUNTER — ASOB RESULT (OUTPATIENT)
Age: 35
End: 2025-01-16

## 2025-01-16 ENCOUNTER — APPOINTMENT (OUTPATIENT)
Dept: MATERNAL FETAL MEDICINE | Facility: CLINIC | Age: 35
End: 2025-01-16
Payer: MEDICAID

## 2025-01-16 VITALS
HEIGHT: 61 IN | RESPIRATION RATE: 16 BRPM | WEIGHT: 177.44 LBS | HEART RATE: 91 BPM | DIASTOLIC BLOOD PRESSURE: 64 MMHG | BODY MASS INDEX: 33.5 KG/M2 | SYSTOLIC BLOOD PRESSURE: 104 MMHG | OXYGEN SATURATION: 99 %

## 2025-01-16 DIAGNOSIS — O99.213 OBESITY COMPLICATING PREGNANCY, THIRD TRIMESTER: ICD-10-CM

## 2025-01-16 DIAGNOSIS — O24.410 GESTATIONAL DIABETES MELLITUS IN PREGNANCY, DIET CONTROLLED: ICD-10-CM

## 2025-01-16 DIAGNOSIS — Z3A.27 27 WEEKS GESTATION OF PREGNANCY: ICD-10-CM

## 2025-01-16 PROCEDURE — 76818 FETAL BIOPHYS PROFILE W/NST: CPT

## 2025-01-16 PROCEDURE — 76816 OB US FOLLOW-UP PER FETUS: CPT

## 2025-01-16 PROCEDURE — 99214 OFFICE O/P EST MOD 30 MIN: CPT | Mod: TH

## 2025-01-30 ENCOUNTER — ASOB RESULT (OUTPATIENT)
Age: 35
End: 2025-01-30

## 2025-01-30 ENCOUNTER — APPOINTMENT (OUTPATIENT)
Dept: MATERNAL FETAL MEDICINE | Facility: CLINIC | Age: 35
End: 2025-01-30
Payer: MEDICAID

## 2025-01-30 VITALS — HEIGHT: 61 IN | BODY MASS INDEX: 33.23 KG/M2 | WEIGHT: 176 LBS

## 2025-01-30 PROCEDURE — G0108 DIAB MANAGE TRN  PER INDIV: CPT | Mod: 95

## 2025-02-14 ENCOUNTER — APPOINTMENT (OUTPATIENT)
Dept: ANTEPARTUM | Facility: CLINIC | Age: 35
End: 2025-02-14
Payer: MEDICAID

## 2025-02-14 ENCOUNTER — ASOB RESULT (OUTPATIENT)
Age: 35
End: 2025-02-14

## 2025-02-14 ENCOUNTER — APPOINTMENT (OUTPATIENT)
Dept: MATERNAL FETAL MEDICINE | Facility: CLINIC | Age: 35
End: 2025-02-14
Payer: MEDICAID

## 2025-02-14 VITALS
SYSTOLIC BLOOD PRESSURE: 108 MMHG | DIASTOLIC BLOOD PRESSURE: 67 MMHG | HEART RATE: 81 BPM | BODY MASS INDEX: 33.79 KG/M2 | WEIGHT: 179 LBS | OXYGEN SATURATION: 99 % | RESPIRATION RATE: 18 BRPM | HEIGHT: 61 IN

## 2025-02-14 DIAGNOSIS — O99.212 OBESITY COMPLICATING PREGNANCY, SECOND TRIMESTER: ICD-10-CM

## 2025-02-14 DIAGNOSIS — Z3A.27 27 WEEKS GESTATION OF PREGNANCY: ICD-10-CM

## 2025-02-14 DIAGNOSIS — Z3A.22 22 WEEKS GESTATION OF PREGNANCY: ICD-10-CM

## 2025-02-14 DIAGNOSIS — Z3A.21 21 WEEKS GESTATION OF PREGNANCY: ICD-10-CM

## 2025-02-14 DIAGNOSIS — O35.9XX0 MATERNAL CARE FOR (SUSPECTED) FETAL ABNORMALITY AND DAMAGE, UNSPECIFIED, NOT APPLICABLE OR UNSPECIFIED: ICD-10-CM

## 2025-02-14 DIAGNOSIS — Z3A.31 31 WEEKS GESTATION OF PREGNANCY: ICD-10-CM

## 2025-02-14 PROCEDURE — 76816 OB US FOLLOW-UP PER FETUS: CPT

## 2025-02-14 PROCEDURE — 76819 FETAL BIOPHYS PROFIL W/O NST: CPT

## 2025-02-14 PROCEDURE — 99213 OFFICE O/P EST LOW 20 MIN: CPT | Mod: TH

## 2025-02-14 PROCEDURE — G2211 COMPLEX E/M VISIT ADD ON: CPT | Mod: NC

## 2025-02-28 ENCOUNTER — APPOINTMENT (OUTPATIENT)
Dept: MATERNAL FETAL MEDICINE | Facility: CLINIC | Age: 35
End: 2025-02-28
Payer: MEDICAID

## 2025-02-28 ENCOUNTER — ASOB RESULT (OUTPATIENT)
Age: 35
End: 2025-02-28

## 2025-02-28 VITALS — HEIGHT: 61 IN | BODY MASS INDEX: 33.79 KG/M2 | WEIGHT: 179 LBS

## 2025-02-28 PROCEDURE — G0108 DIAB MANAGE TRN  PER INDIV: CPT | Mod: 95

## 2025-03-13 DIAGNOSIS — Z3A.31 31 WEEKS GESTATION OF PREGNANCY: ICD-10-CM

## 2025-03-13 DIAGNOSIS — Z3A.35 35 WEEKS GESTATION OF PREGNANCY: ICD-10-CM

## 2025-03-14 ENCOUNTER — APPOINTMENT (OUTPATIENT)
Dept: MATERNAL FETAL MEDICINE | Facility: CLINIC | Age: 35
End: 2025-03-14
Payer: MEDICAID

## 2025-03-14 ENCOUNTER — APPOINTMENT (OUTPATIENT)
Dept: ANTEPARTUM | Facility: CLINIC | Age: 35
End: 2025-03-14
Payer: MEDICAID

## 2025-03-14 ENCOUNTER — ASOB RESULT (OUTPATIENT)
Age: 35
End: 2025-03-14

## 2025-03-14 VITALS
OXYGEN SATURATION: 99 % | DIASTOLIC BLOOD PRESSURE: 66 MMHG | HEIGHT: 61 IN | BODY MASS INDEX: 34.36 KG/M2 | RESPIRATION RATE: 18 BRPM | SYSTOLIC BLOOD PRESSURE: 106 MMHG | WEIGHT: 182 LBS | HEART RATE: 78 BPM

## 2025-03-14 PROCEDURE — 99213 OFFICE O/P EST LOW 20 MIN: CPT | Mod: TH

## 2025-03-14 PROCEDURE — G2211 COMPLEX E/M VISIT ADD ON: CPT | Mod: NC

## 2025-03-14 PROCEDURE — 76818 FETAL BIOPHYS PROFILE W/NST: CPT

## 2025-03-14 PROCEDURE — 76816 OB US FOLLOW-UP PER FETUS: CPT

## 2025-03-21 ENCOUNTER — ASOB RESULT (OUTPATIENT)
Age: 35
End: 2025-03-21

## 2025-03-21 ENCOUNTER — APPOINTMENT (OUTPATIENT)
Dept: ANTEPARTUM | Facility: CLINIC | Age: 35
End: 2025-03-21
Payer: MEDICAID

## 2025-03-21 PROCEDURE — 76818 FETAL BIOPHYS PROFILE W/NST: CPT

## 2025-03-27 ENCOUNTER — APPOINTMENT (OUTPATIENT)
Dept: MATERNAL FETAL MEDICINE | Facility: CLINIC | Age: 35
End: 2025-03-27

## 2025-03-28 ENCOUNTER — APPOINTMENT (OUTPATIENT)
Dept: ANTEPARTUM | Facility: CLINIC | Age: 35
End: 2025-03-28

## 2025-03-28 ENCOUNTER — ASOB RESULT (OUTPATIENT)
Age: 35
End: 2025-03-28

## 2025-03-28 PROCEDURE — 76818 FETAL BIOPHYS PROFILE W/NST: CPT
